# Patient Record
Sex: FEMALE | Race: WHITE
[De-identification: names, ages, dates, MRNs, and addresses within clinical notes are randomized per-mention and may not be internally consistent; named-entity substitution may affect disease eponyms.]

---

## 2019-03-25 ENCOUNTER — HOSPITAL ENCOUNTER (OUTPATIENT)
Dept: HOSPITAL 10 - OBT | Age: 24
Discharge: HOME | End: 2019-03-25
Attending: SPECIALIST
Payer: COMMERCIAL

## 2019-03-25 ENCOUNTER — HOSPITAL ENCOUNTER (OUTPATIENT)
Dept: HOSPITAL 91 - OBT | Age: 24
Discharge: HOME | End: 2019-03-25
Payer: COMMERCIAL

## 2019-03-25 VITALS
BODY MASS INDEX: 42.95 KG/M2 | WEIGHT: 204.59 LBS | HEIGHT: 58 IN | WEIGHT: 204.59 LBS | BODY MASS INDEX: 42.95 KG/M2 | HEIGHT: 58 IN

## 2019-03-25 VITALS — HEART RATE: 97 BPM | DIASTOLIC BLOOD PRESSURE: 65 MMHG | SYSTOLIC BLOOD PRESSURE: 99 MMHG | RESPIRATION RATE: 18 BRPM

## 2019-03-25 DIAGNOSIS — Z3A.37: ICD-10-CM

## 2019-03-25 DIAGNOSIS — O36.5930: Primary | ICD-10-CM

## 2019-03-25 PROCEDURE — 76818 FETAL BIOPHYS PROFILE W/NST: CPT

## 2019-03-25 PROCEDURE — 76815 OB US LIMITED FETUS(S): CPT

## 2019-03-25 PROCEDURE — G0463 HOSPITAL OUTPT CLINIC VISIT: HCPCS

## 2019-03-25 NOTE — TRIAGE
===================================

OB Triage

===================================

Datetime Report Generated by CPN: 03/25/2019 19:43

   

   

===========================

Datetime: 03/25/2019 19:24

===========================

   

 Stage of Pregnancy:  OB Triage

   

===========================

Datetime: 03/25/2019 18:52

===========================

   

 Stage of Pregnancy:  OB Triage

   

===========================

Datetime: 03/25/2019 18:35

===========================

   

   

===================================

Labor Evaluation

===================================

   

 Frequency:  0

 Monitor Mode:  External

 Pattern:  Normal: <= 5 Contractions in 10 Minutes

 Resting Tone [place]:  Relaxed

   

===================================

Fetal Heart Rate

===================================

   

 FHR Baseline Rate:  135

 Monitor Mode:  External US

 Variability:  Moderate 6-25 bpm

 Accelerations:  10X10

 Decelerations:  None

 Category:  Category I

   

===================================

Pain Assessment

===================================

   

 Pain Scale:  0

 Pain Presence:  None/Denies

 Pain Type:  N/A

 Pain Goal:  3

 Pain Relief Measures:  Comfort Measures

   

===========================

Datetime: 03/25/2019 17:42

===========================

   

 Stage of Pregnancy:  OB Triage

   

===========================

Datetime: 03/25/2019 16:14

===========================

   

 Stage of Pregnancy:  OB Triage

 Assessment Type:  Triage

   

===================================

Maternal Assessment

===================================

   

 Level of Consciousness:  Fully Conscious

 DTR's/Clonus:  DTRs 2+; No Clonus

 Headache:  Denies

 Blurred Vision:  No

 Respiratory Effort:  Unlabored; Regular Rhythm; Equal Expansion

 Breath Sounds, Left:  Clear and Equal

 Breath Sounds, Right:  Clear and Equal

 Nausea/Vomiting:  Denies

 RUQ Epigastric Pain:  Denies

 Facial Edema:  None

 Temperature Route:  Axillary

   

===================================

Fall Risk Assessment

===================================

   

 History of Falling:  (0) No

 Secondary Diagnosis:  (0) No

 Ambulatory Aid:  (0) Bedrest/Nurse Assist

 IV Therapy:  (0) No

 Gait:  (0) Normal/Bedrest/Immobile

 Mental Status:  (0) Oriented to Own Ability

 Fall Score:  0

 Fall Risk Score Definition:  No Risk: No action required

   

===================================

Labor Evaluation

===================================

   

 Frequency:  0

 Monitor Mode:  External

 Pattern:  Normal: <= 5 Contractions in 10 Minutes

 Resting Tone [place]:  Relaxed

   

===================================

Fetal Heart Rate

===================================

   

 FHR Baseline Rate:  150

 Monitor Mode:  External US

 Variability:  Moderate 6-25 bpm

 Decelerations:  None

   

===================================

Pain Assessment

===================================

   

 Pain Scale:  0

 Pain Presence:  None/Denies

 Pain Type:  N/A

 Pain Goal:  3

 Pain Relief Measures:  Comfort Measures

   

===========================

Datetime: 03/25/2019 16:12

===========================

   

 Time of Arrival:  03/25/2019 15:02

 EGA:  37.4

 Arrived By:  Ambulatory

 Arrived From:  Dr. Office

 Chief Complaint:  REFERRED FROM CLINIC FOR SIZE/DATES, DENIES LEAKING, BLEEDING, OR UC'S

 Fetal Movement:  Present

 Contractions:  Denies/Absent

 Rupture of Membranes:  Denies

 Vaginal Bleeding:  None

 Vaginal Discharge:  Denies

 Recent Sexual Intercouse:  Denies

 Abdominal Trauma:  Not Applicable

 Time Provider Notified:  03/25/2019 17:42

 Provider Notified:  nadya

 Initial Plan:  MONITOR, U/S

## 2019-03-25 NOTE — HP
Date/Time of Note


Date/Time of Note


DATE: 3/25/19 


TIME: 19:58





OB - History


Hx of Present Pregnancy


Free Text/Dictation


22 YO G1 with IUP at 37 weeks with EDC 04/11/2019. she was seen by Dr. Medina 6 


days ago. records NA at this time. she reports good FM. she denies UC, LOF per 


vagina, or vaginal bleeding. she is sent here for antepartum testing for 


possible IUGR. NST is category one.


Prenatal Care:  [facility]


Ultrasounds:  Normal mid trimester US


Obstetrical Complications:  None, Fetal Growth Restriction, Other (possible 


IUGR)


Medical Complications:  None





Past Family/Social History


*


Past Medical, Surgical, Family and Obstetric Histories reviewed from prenatal 


chart.





OB  Admission Exam


Vital Signs


Vital Signs





Vital Signs


  Date      Temp  Pulse  Resp  B/P (MAP)   Pulse Ox  O2          O2 Flow    FiO2


Time                                                 Delivery    Rate


   3/25/19  98.2     97    18  99/65 (76)


     16:21








Physical Exam


HEENT:  WNL


Heart:  Rhythm Normal


Lungs:  Clear, Equal


Abdomen:  WNL


Extremities:  Normal


Reflexes:  Normal





OB  Assessment/Plan


Other Assessment:


IUP at 37+ weeks


here for antepartum testing for possible IUGR


Plan:  Other (may be discharged home. NST in 3 day. )











HUNTER VALLEJO MD            Mar 25, 2019 20:03

## 2019-03-31 ENCOUNTER — HOSPITAL ENCOUNTER (OUTPATIENT)
Dept: HOSPITAL 91 - OBT | Age: 24
Discharge: HOME | End: 2019-03-31
Payer: COMMERCIAL

## 2019-03-31 ENCOUNTER — HOSPITAL ENCOUNTER (OUTPATIENT)
Dept: HOSPITAL 10 - OBT | Age: 24
Discharge: HOME | End: 2019-03-31
Attending: SPECIALIST
Payer: COMMERCIAL

## 2019-03-31 VITALS
HEIGHT: 62 IN | HEIGHT: 62 IN | BODY MASS INDEX: 26.29 KG/M2 | WEIGHT: 142.86 LBS | BODY MASS INDEX: 26.29 KG/M2 | WEIGHT: 142.86 LBS

## 2019-03-31 DIAGNOSIS — O26.93: Primary | ICD-10-CM

## 2019-03-31 DIAGNOSIS — O26.853: ICD-10-CM

## 2019-03-31 DIAGNOSIS — Z3A.38: ICD-10-CM

## 2019-03-31 PROCEDURE — G0463 HOSPITAL OUTPT CLINIC VISIT: HCPCS

## 2019-03-31 PROCEDURE — 76818 FETAL BIOPHYS PROFILE W/NST: CPT

## 2019-03-31 NOTE — PN
Triage Information


Date/Time





Reason for visit:  Vag spotting / bleeding


Weeks of Gestation


38w3d


/Para





Diabetes:  none


Hypertention:  none


Additional information


c/o vaginal pain





Objective


/69, P100 R 18 T 98.4


Fetal Heart Rate:  140's


Fetal Heart Rate Comments


CAT I


Contractions:  >10 Minutes Apart


Exam


VE 0/0/-3


recked  in 3hrs?  no change





Results/Medications


Imaging Results


BPP 


JOCELINE   15


Disposition:  Discharge


Assessment/Plan


A  IUP 38w3d


    NIL


    vaginal pain with spotting


P  discharge home  with routine labor instructions


    RTH prSHEKHAR Taylor MD                Mar 31, 2019 23:15

## 2019-03-31 NOTE — TRIAGE
===================================

OB Triage

===================================

Datetime Report Generated by CPN: 03/31/2019 22:14

   

   

===========================

Datetime: 03/31/2019 21:16

===========================

   

 Stage of Pregnancy:  OB Triage

   

===================================

Labor Evaluation

===================================

   

 Frequency:  1-6

 Monitor Mode:  External

 Duration (sec)2399:  

 Quality:  Mild

 Pattern:  Normal: <= 5 Contractions in 10 Minutes

 Resting Tone [place]:  Relaxed

   

===================================

Fetal Heart Rate

===================================

   

 FHR Baseline Rate:  130

 Monitor Mode:  External US

 FHR Baseline Changes:  No Baseline Change

 Variability:  Moderate 6-25 bpm

 Accelerations:  15X15

 Decelerations:  None

 Category:  Category I

   

===========================

Datetime: 03/31/2019 21:15

===========================

   

   

===================================

Vaginal Exam

===================================

   

 Dilatation (cms):  0.0

 Effacement (%):  50

 Station:  -2

 Exam By:  BOB Drake

 Vaginal Bleeding:  None

 Cervix, Consistency:  Firm

 Cervix, Position:  Posterior

   

===========================

Datetime: 03/31/2019 21:00

===========================

   

 Stage of Pregnancy:  OB Triage

   

===================================

Labor Evaluation

===================================

   

 Frequency:  2-6

 Monitor Mode:  External

 Duration (sec)2399:  

 Quality:  Mild

 Pattern:  Normal: <= 5 Contractions in 10 Minutes

 Resting Tone [place]:  Relaxed

   

===================================

Fetal Heart Rate

===================================

   

 FHR Baseline Rate:  130

 Monitor Mode:  External US

 Variability:  Moderate 6-25 bpm

 Accelerations:  15X15

 Decelerations:  None

 Category:  Category I

   

===========================

Datetime: 03/31/2019 20:00

===========================

   

 Stage of Pregnancy:  OB Triage

   

===================================

Labor Evaluation

===================================

   

 Frequency:  1-5

 Monitor Mode:  External

 Duration (sec)2399:  

 Quality:  Mild

 Pattern:  Normal: <= 5 Contractions in 10 Minutes

 Resting Tone [place]:  Relaxed

   

===================================

Fetal Heart Rate

===================================

   

 FHR Baseline Rate:  135

 Monitor Mode:  External US

 Variability:  Moderate 6-25 bpm

 Accelerations:  15X15

 Decelerations:  None

 Category:  Category I

   

===========================

Datetime: 03/31/2019 19:35

===========================

   

 Stage of Pregnancy:  OB Triage

   

===========================

Datetime: 03/31/2019 19:19

===========================

   

 Stage of Pregnancy:  OB Triage

   

===========================

Datetime: 03/31/2019 19:17

===========================

   

 Stage of Pregnancy:  OB Triage

 Assessment Type:  Triage

   

===================================

Maternal Assessment

===================================

   

 Level of Consciousness:  Fully Conscious

 DTR's/Clonus:  DTRs 2+; No Clonus

 Headache:  Denies

 Blurred Vision:  No

 Respiratory Effort:  Unlabored; Regular Rhythm; Equal Expansion

 Breath Sounds, Left:  Clear and Equal

 Breath Sounds, Right:  Clear and Equal

 Nausea/Vomiting:  Denies

 RUQ Epigastric Pain:  Denies

 Lower Extremities Edema:  None

     Degree:  None

 Upper Extremities Edema:  None

     Degree:  None

 Facial Edema:  None

 Temperature Route:  Oral

   

===================================

Fall Risk Assessment

===================================

   

 History of Falling:  (0) No

 Secondary Diagnosis:  (0) No

 Ambulatory Aid:  (0) Bedrest/Nurse Assist

 IV Therapy:  (0) No

 Gait:  (0) Normal/Bedrest/Immobile

 Mental Status:  (0) Oriented to Own Ability

 Fall Score:  0

 Fall Risk Score Definition:  No Risk: No action required

   

===================================

Pain Assessment

===================================

   

 Pain Scale:  4

 Pain Presence:  Intermittent

 Pain Type:  Cramping; Ache

 Pain Location:  Back

 Pain Relief Measures:  Comfort Measures

   

===========================

Datetime: 03/31/2019 19:02

===========================

   

   

===================================

Maternal Assessment

===================================

   

 Level of Consciousness:  Fully Conscious

 DTR's/Clonus:  DTRs 1+

 Headache:  Denies

 Blurred Vision:  No

 Nausea/Vomiting:  Denies

 RUQ Epigastric Pain:  Denies

 Facial Edema:  None

   

===================================

Labor Evaluation

===================================

   

 Frequency:  X1

 Monitor Mode:  External

 Duration (sec)2399:  50

 Quality:  Mild

 Pattern:  Normal: <= 5 Contractions in 10 Minutes

 Resting Tone [place]:  Relaxed

   

===================================

Fetal Heart Rate

===================================

   

 FHR Baseline Rate:  135

 Monitor Mode:  External US

 Variability:  Moderate 6-25 bpm

 Accelerations:  15X15

 Decelerations:  None

 Category:  Category I

   

===================================

Pain Assessment

===================================

   

 Pain Scale:  0

 Pain Presence:  None/Denies

 Pain Type:  N/A

 Pain Goal:  3

 Membrane Status:  Intact

   

===========================

Datetime: 03/31/2019 18:50

===========================

   

 Assessment Type:  Triage

   

===================================

Maternal Assessment

===================================

   

 Level of Consciousness:  Fully Conscious

 DTR's/Clonus:  DTRs 2+; No Clonus

 Headache:  Denies

 Blurred Vision:  No

 Respiratory Effort:  Unlabored; Regular Rhythm; Equal Expansion

 Breath Sounds, Left:  Clear and Equal

 Breath Sounds, Right:  Clear and Equal

 Nausea/Vomiting:  Denies

 RUQ Epigastric Pain:  Denies

 Lower Extremities Edema:  None

     Degree:  None

 Upper Extremities Edema:  None

     Degree:  None

 Facial Edema:  None

   

===================================

Fall Risk Assessment

===================================

   

 History of Falling:  (0) No

 Secondary Diagnosis:  (0) No

 Ambulatory Aid:  (0) Bedrest/Nurse Assist

 IV Therapy:  (0) No

 Gait:  (0) Normal/Bedrest/Immobile

 Mental Status:  (0) Oriented to Own Ability

 Fall Score:  0

 Fall Risk Score Definition:  No Risk: No action required

   

===========================

Datetime: 03/31/2019 18:32

===========================

   

 Time of Arrival:  03/31/2019 18:32

 EGA:  38.3

 Arrived By:  Wheelchair

 Arrived From:  Home

 Chief Complaint:  PT CAME IN C/O SPOTTING THIS AM, VAGINAL PAIN AND DFM

 Fetal Movement:  Decreased

 Contractions:  Denies/Absent

 Rupture of Membranes:  Denies

 Vaginal Discharge:  Denies

 Recent Sexual Intercouse:  Denies

 Abdominal Trauma:  Not Applicable

 Additional Patient Complaints:  NONE

 Initial Plan:  NST AND BPP

   

===========================

Datetime: 03/25/2019 16:14

===========================

   

 Fall Score:  0

 Fall Risk Score Definition:  No Risk: No action required

   

===========================

Datetime: 03/25/2019 16:12

===========================

   

 EGA:  37.4

## 2019-04-09 ENCOUNTER — HOSPITAL ENCOUNTER (OUTPATIENT)
Dept: HOSPITAL 91 - OBT | Age: 24
Discharge: HOME | End: 2019-04-09
Payer: COMMERCIAL

## 2019-04-09 ENCOUNTER — HOSPITAL ENCOUNTER (OUTPATIENT)
Dept: HOSPITAL 10 - L-D | Age: 24
Discharge: HOME | End: 2019-04-09
Attending: SPECIALIST
Payer: COMMERCIAL

## 2019-04-09 VITALS
BODY MASS INDEX: 26.49 KG/M2 | BODY MASS INDEX: 26.49 KG/M2 | WEIGHT: 143.96 LBS | HEIGHT: 62 IN | WEIGHT: 143.96 LBS | HEIGHT: 62 IN

## 2019-04-09 VITALS — DIASTOLIC BLOOD PRESSURE: 68 MMHG | RESPIRATION RATE: 17 BRPM | SYSTOLIC BLOOD PRESSURE: 102 MMHG | HEART RATE: 81 BPM

## 2019-04-09 DIAGNOSIS — Z3A.38: ICD-10-CM

## 2019-04-09 DIAGNOSIS — O26.843: ICD-10-CM

## 2019-04-09 DIAGNOSIS — O60.03: Primary | ICD-10-CM

## 2019-04-09 PROCEDURE — 76818 FETAL BIOPHYS PROFILE W/NST: CPT

## 2019-04-09 PROCEDURE — G0463 HOSPITAL OUTPT CLINIC VISIT: HCPCS

## 2019-04-09 NOTE — PN
Triage Information


Date/Time


2019


Reason for visit:  Uterine contractions


Weeks of Gestation


38w 2d


/Para


1/0


Diabetes:  none


Hypertention:  none


Additional information


PMHx: none.


PSHx: none.


NKDA





Objective





Vital Signs


  Date      Temp  Pulse  Resp  B/P (MAP)   Pulse Ox  O2          O2 Flow    FiO2


Time                                                 Delivery    Rate


    19  98.2     81    17      102/68            Room Air


     04:32                           (79)





Fetal Heart Rate:  120's


Contractions:  6-10 Minutes Apart (sometimes closer at 3 to 5 minutes apart)


Exam


40%/FT/-4





Results/Medications


Imaging Results


Pending BPP.


Disposition:  Discharge


Assessment/Plan


A: IUP at 38w 2d.


Dates discrepancy as pt had an early US at 10 weeks which gives her a due date 


of  and the later US's all date her with a due date of .


Early labor.


P: BPP pending. If all fine then pt to be d/c'ed home and is to return when the 


contractions are a lot stronger and last longer or if she breaks her water. 


Offered the pt the ability to stay or to go and initially she wanted to stay and


then she decided to go. She [place] close by. We will still work to get the hard c


opy of her initial US. Reviewed labor precaution with the pt.











MICK SIMON MD              2019 06:24

## 2019-04-10 ENCOUNTER — HOSPITAL ENCOUNTER (OUTPATIENT)
Dept: HOSPITAL 10 - OBT | Age: 24
Discharge: HOME | End: 2019-04-10
Attending: SPECIALIST
Payer: COMMERCIAL

## 2019-04-10 ENCOUNTER — HOSPITAL ENCOUNTER (OUTPATIENT)
Dept: HOSPITAL 91 - OBT | Age: 24
Discharge: HOME | End: 2019-04-10
Payer: COMMERCIAL

## 2019-04-10 DIAGNOSIS — Z3A.38: ICD-10-CM

## 2019-04-10 PROCEDURE — G0463 HOSPITAL OUTPT CLINIC VISIT: HCPCS

## 2019-04-10 NOTE — PN
Triage Information


Date/Time





Reason for visit:  Uterine contractions


Weeks of Gestation


23-year-old  1 para 0 at 38 weeks and 3 days of gestation with estimated 


date of delivery 2019


Patient presents with chief complaint of uterine contractions, she reports 


positive fetal movement, denies any vaginal bleeding or leaking fluid


/Para


 1 para 0


Diabetes:  none


Hypertention:  none





Objective


Fetal Heart Rate:  140's


Fetal Heart Rate Comments


Fetal heart rate tracing category 1


Contractions:  6-10 Minutes Apart


Exam


Cervix fingertip/30%/-3 per nurse


Disposition:  Discharge


Assessment/Plan


Patient is not in labor


Fetal kick count instructions were given


Labor precautions were given


Patient instructed to follow-up with her own OB/GYN in 1-2 days











SARAI SCHULTZ MD             Apr 10, 2019 23:49

## 2019-04-10 NOTE — TRIAGE
===================================

OB Triage

===================================

Datetime Report Generated by CPN: 04/10/2019 23:51

   

   

===========================

Datetime: 04/10/2019 23:41

===========================

   

 Stage of Pregnancy:  OB Triage

   

===========================

Datetime: 04/10/2019 23:21

===========================

   

   

===================================

Labor Evaluation

===================================

   

 Frequency:  4-7

 Monitor Mode:  External

 Duration (sec)2399:  70-90

 Pattern:  Normal: <= 5 Contractions in 10 Minutes

   

===================================

Fetal Heart Rate

===================================

   

 FHR Baseline Rate:  120

 FHR Baseline Changes:  No Baseline Change

 Variability:  Moderate 6-25 bpm

   

===========================

Datetime: 04/10/2019 22:47

===========================

   

 Stage of Pregnancy:  OB Triage

 Assessment Type:  Triage

   

===================================

Vaginal Exam

===================================

   

 Dilatation (cms):  0.5

 Effacement (%):  30

 Station:  -3

 Exam By:  GISEAL 

 Vaginal Bleeding:  Scant

 Cervix, Consistency:  Firm

 Cervix, Position:  Midposition

 Fetal Presentation 'A':  Cephalic

   

===========================

Datetime: 04/10/2019 22:28

===========================

   

 Time of Arrival:  04/10/2019 22:28

 EGA:  38.3

 Arrived By:  Wheelchair

 Arrived From:  Home

 Chief Complaint:  UC'S SINCE 2000

      

 Fetal Movement:  Present

 Contractions:  Regular

 Time Contractions Began:  04/10/2019 20:00

 Contractions:  Q6MIN

 Rupture of Membranes:  Denies

 Vaginal Bleeding:  Scant

 Vaginal Discharge:  Denies

 Recent Sexual Intercouse:  Denies

 Abdominal Trauma:  Not Applicable

 Patient Complaints:  Contractions

 Additional Patient Complaints:  PATIENT SEEN ON 04/09/19 FOR SAME COMPLAINT

   

===========================

Datetime: 04/09/2019 07:14

===========================

   

 Stage of Pregnancy:  OB Triage

   

===================================

Labor Evaluation

===================================

   

 Frequency:  1-5

 Monitor Mode:  External

 Pattern:  Normal: <= 5 Contractions in 10 Minutes

 Resting Tone [place]:  Relaxed

   

===================================

Fetal Heart Rate

===================================

   

 FHR Baseline Rate:  125

 Monitor Mode:  External US

 Variability:  Moderate 6-25 bpm

 Accelerations:  15X15

 Decelerations:  None

 Category:  Category I

   

===================================

Pain Assessment

===================================

   

 Pain Scale:  1

 Pain Presence:  Intermittent

 Pain Type:  Cramping

 Pain Location:  Abdomen

 Pain Goal:  0

 Pain Relief Measures:  Comfort Measures

   

===========================

Datetime: 04/09/2019 07:00

===========================

   

   

===================================

Labor Evaluation

===================================

   

 Frequency:  2-5

      

 Monitor Mode:  External

 Duration (sec)2399:  

 Pattern:  Normal: <= 5 Contractions in 10 Minutes

   

===================================

Fetal Heart Rate

===================================

   

 FHR Baseline Rate:  130

 Monitor Mode:  External US

 Variability:  Moderate 6-25 bpm (Annotations: Period of minimal variability)

 Accelerations:  15X15

 Decelerations:  None

 Category:  Category I

   

===========================

Datetime: 04/09/2019 06:26

===========================

   

 Resting Tone [place]:  Relaxed

   

===========================

Datetime: 04/09/2019 06:00

===========================

   

   

===================================

Labor Evaluation

===================================

   

 Frequency:  2-6

 Monitor Mode:  External

 Duration (sec)2399:  

 Pattern:  Normal: <= 5 Contractions in 10 Minutes

   

===================================

Fetal Heart Rate

===================================

   

 FHR Baseline Rate:  125

 Monitor Mode:  External US

 Variability:  Moderate 6-25 bpm (Annotations: Periods of minimal variability)

 Accelerations:  15X15

 Decelerations:  None

 Category:  Category I

   

===========================

Datetime: 04/09/2019 05:51

===========================

   

 Quality:  Mild

 Resting Tone [place]:  Relaxed

   

===================================

Pain Assessment

===================================

   

 Pain Scale:  2

 Pain Presence:  Intermittent

 Pain Type:  Contraction

 Pain Location:  Abdomen; Back

 Pain Relief Measures:  Comfort Measures

   

===========================

Datetime: 04/09/2019 05:46

===========================

   

   

===================================

Vaginal Exam

===================================

   

 Dilatation (cms):  0.5

 Effacement (%):  40

 Station:  -4

 Exam By:  Dr. Reiche

 Vaginal Bleeding:  Scant

   

===========================

Datetime: 04/09/2019 05:45

===========================

   

 Quality:  Mild

   

===========================

Datetime: 04/09/2019 05:25

===========================

   

   

===================================

Pain Assessment

===================================

   

 Pain Scale:  10

 Pain Presence:  Intermittent

 Pain Type:  Contraction

 Pain Location:  Abdomen; Back

 Pain Relief Measures:  Comfort Measures

   

===========================

Datetime: 04/09/2019 05:00

===========================

   

   

===================================

Labor Evaluation

===================================

   

 Frequency:  5-10

 Monitor Mode:  External

 Duration (sec)2399:  

 Pattern:  Normal: <= 5 Contractions in 10 Minutes

   

===================================

Fetal Heart Rate

===================================

   

 FHR Baseline Rate:  125

 Monitor Mode:  External US

 Variability:  Minimal - Undetectable to <=5 bpm

 Accelerations:  15X15

 Decelerations:  None

 Category:  Category II

   

===========================

Datetime: 04/09/2019 04:41

===========================

   

   

===================================

Vaginal Exam

===================================

   

 Dilatation (cms):  0.5

 Effacement (%):  40

 Station:  -4

 Exam By:  Lizzy VANESSA RN

 Cervix, Consistency:  Moderate

 Cervix, Position:  Midposition

   

===========================

Datetime: 04/09/2019 04:32

===========================

   

 Stage of Pregnancy:  OB Triage

 Assessment Type:  Triage

   

===================================

Maternal Assessment

===================================

   

 Level of Consciousness:  Fully Conscious

 DTR's/Clonus:  DTRs 2+; No Clonus

 Headache:  Denies

 Blurred Vision:  No

 Respiratory Effort:  Unlabored; Regular Rhythm; Equal Expansion

 Breath Sounds, Left:  Clear and Equal

 Breath Sounds, Right:  Clear and Equal

 Nausea/Vomiting:  Denies

 RUQ Epigastric Pain:  Denies

 Lower Extremities Edema:  None

     Degree:  None

 Upper Extremities Edema:  None

     Degree:  None

 Facial Edema:  None

 Temperature Route:  Oral

   

===================================

Fall Risk Assessment

===================================

   

 History of Falling:  (0) No

 Secondary Diagnosis:  (0) No

 Ambulatory Aid:  (0) Bedrest/Nurse Assist

 IV Therapy:  (0) No

 Gait:  (0) Normal/Bedrest/Immobile

 Mental Status:  (0) Oriented to Own Ability

 Fall Score:  0

 Fall Risk Score Definition:  No Risk: No action required

 Comments:  Patient states she feels active fetal movement. 

   

===================================

Pain Assessment

===================================

   

 Pain Scale:  8

 Pain Presence:  Intermittent

 Pain Type:  Contraction

 Pain Location:  Abdomen; Back

 Pain Relief Measures:  Comfort Measures

   

===========================

Datetime: 04/09/2019 04:29

===========================

   

 Monitor Mode:  External (Annotations: applied)

 Quality:  Mild

 Resting Tone [place]:  Relaxed

 Monitor Mode:  External US (Annotations: applied)

   

===========================

Datetime: 04/09/2019 04:10

===========================

   

 Time of Arrival:  04/09/2019 04:10

 EGA:  38.2

 Arrived By:  Wheelchair

 Arrived From:  Home

 Chief Complaint:  contractions since 04/09/19 at 0300

 Fetal Movement:  Present

 Contractions:  Irregular

 Contractions:  Q3-5min per patient

 Rupture of Membranes:  Denies

 Vaginal Bleeding:  None

 Vaginal Discharge:  Denies

 Recent Sexual Intercouse:  Denies

 Abdominal Trauma:  Not Applicable

 Patient Complaints:  Contractions

 Additional Patient Complaints:  EDC discrepancy between 04/11/19 and 04/21/19

 Time Provider Notified:  04/09/2019 05:03

 Provider Notified:  Dr. Bai

 Initial Plan:  EFM x2, SVE

   

===========================

Datetime: 03/31/2019 21:08

===========================

   

 Stage of Pregnancy:  OB Triage

   

===========================

Datetime: 03/31/2019 19:17

===========================

   

 Fall Score:  0

 Fall Risk Score Definition:  No Risk: No action required

   

===========================

Datetime: 03/31/2019 18:50

===========================

   

 Fall Score:  0

 Fall Risk Score Definition:  No Risk: No action required

   

===========================

Datetime: 03/31/2019 18:32

===========================

   

 EGA:  37.0

 Time Provider Notified:  03/31/2019 18:32

 Provider Notified:  

   

===========================

Datetime: 03/25/2019 16:14

===========================

   

 Fall Score:  0

 Fall Risk Score Definition:  No Risk: No action required

   

===========================

Datetime: 03/25/2019 16:12

===========================

   

 EGA:  36.1

## 2019-04-11 ENCOUNTER — HOSPITAL ENCOUNTER (INPATIENT)
Dept: HOSPITAL 10 - L-D | Age: 24
LOS: 3 days | Discharge: HOME | End: 2019-04-14
Attending: SPECIALIST | Admitting: SPECIALIST
Payer: COMMERCIAL

## 2019-04-11 ENCOUNTER — HOSPITAL ENCOUNTER (INPATIENT)
Dept: HOSPITAL 91 - L-D | Age: 24
LOS: 3 days | Discharge: HOME | End: 2019-04-14
Payer: COMMERCIAL

## 2019-04-11 VITALS — RESPIRATION RATE: 18 BRPM | HEART RATE: 88 BPM | DIASTOLIC BLOOD PRESSURE: 65 MMHG | SYSTOLIC BLOOD PRESSURE: 98 MMHG

## 2019-04-11 VITALS
HEIGHT: 62 IN | WEIGHT: 142.2 LBS | WEIGHT: 142.2 LBS | BODY MASS INDEX: 26.17 KG/M2 | BODY MASS INDEX: 26.17 KG/M2 | HEIGHT: 62 IN

## 2019-04-11 DIAGNOSIS — O48.0: Primary | ICD-10-CM

## 2019-04-11 DIAGNOSIS — Z3A.40: ICD-10-CM

## 2019-04-11 LAB
ADD MAN DIFF?: NO
BASOPHIL #: 0 10^3/UL (ref 0–0.1)
BASOPHILS %: 0.1 % (ref 0–2)
EOSINOPHILS #: 0 10^3/UL (ref 0–0.5)
EOSINOPHILS %: 0.1 % (ref 0–7)
HEMATOCRIT: 38.9 % (ref 37–47)
HEMOGLOBIN: 13.3 G/DL (ref 12–16)
HEPATITIS B SURFACE ANTIGEN: NEGATIVE
IMMATURE GRANS #M: 0.05 10^3/UL (ref 0–0.03)
IMMATURE GRANS % (M): 0.5 % (ref 0–0.43)
INR: 0.9
LYMPHOCYTES #: 1.5 10^3/UL (ref 0.8–2.9)
LYMPHOCYTES %: 14.3 % (ref 15–51)
MEAN CORPUSCULAR HEMOGLOBIN: 30.3 PG (ref 29–33)
MEAN CORPUSCULAR HGB CONC: 34.2 G/DL (ref 32–37)
MEAN CORPUSCULAR VOLUME: 88.6 FL (ref 82–101)
MEAN PLATELET VOLUME: 9.2 FL (ref 7.4–10.4)
MONOCYTE #: 0.7 10^3/UL (ref 0.3–0.9)
MONOCYTES %: 6.5 % (ref 0–11)
NEUTROPHIL #: 8 10^3/UL (ref 1.6–7.5)
NEUTROPHILS %: 78.5 % (ref 39–77)
NUCLEATED RED BLOOD CELLS #: 0 10^3/UL (ref 0–0)
NUCLEATED RED BLOOD CELLS%: 0 /100WBC (ref 0–0)
PARTIAL THROMBOPLASTIN TIME: 28.6 SEC (ref 23–35)
PLATELET COUNT: 247 10^3/UL (ref 140–415)
PROTIME: 12.3 SEC (ref 11.9–14.9)
PT RATIO: 1
RED BLOOD COUNT: 4.39 10^6/UL (ref 4.2–5.4)
RED CELL DISTRIBUTION WIDTH: 13.5 % (ref 11.5–14.5)
WHITE BLOOD COUNT: 10.2 10^3/UL (ref 4.8–10.8)

## 2019-04-11 PROCEDURE — 86592 SYPHILIS TEST NON-TREP QUAL: CPT

## 2019-04-11 PROCEDURE — 85730 THROMBOPLASTIN TIME PARTIAL: CPT

## 2019-04-11 PROCEDURE — 76815 OB US LIMITED FETUS(S): CPT

## 2019-04-11 PROCEDURE — 86900 BLOOD TYPING SEROLOGIC ABO: CPT

## 2019-04-11 PROCEDURE — 3E033VJ INTRODUCTION OF OTHER HORMONE INTO PERIPHERAL VEIN, PERCUTANEOUS APPROACH: ICD-10-PCS

## 2019-04-11 PROCEDURE — 3E033VJ INTRODUCTION OF OTHER HORMONE INTO PERIPHERAL VEIN, PERCUTANEOUS APPROACH: ICD-10-PCS | Performed by: OBSTETRICS & GYNECOLOGY

## 2019-04-11 PROCEDURE — 85610 PROTHROMBIN TIME: CPT

## 2019-04-11 PROCEDURE — 86850 RBC ANTIBODY SCREEN: CPT

## 2019-04-11 PROCEDURE — 85025 COMPLETE CBC W/AUTO DIFF WBC: CPT

## 2019-04-11 PROCEDURE — 86901 BLOOD TYPING SEROLOGIC RH(D): CPT

## 2019-04-11 PROCEDURE — 87340 HEPATITIS B SURFACE AG IA: CPT

## 2019-04-11 RX ADMIN — PYRIDOXINE HYDROCHLORIDE 1 MLS/HR: 100 INJECTION, SOLUTION INTRAMUSCULAR; INTRAVENOUS at 20:00

## 2019-04-11 RX ADMIN — Medication 1 MLS/HR: at 21:40

## 2019-04-11 RX ADMIN — PYRIDOXINE HYDROCHLORIDE SCH MLS/HR: 100 INJECTION, SOLUTION INTRAMUSCULAR; INTRAVENOUS at 20:00

## 2019-04-11 RX ADMIN — PYRIDOXINE HYDROCHLORIDE 1 MLS/HR: 100 INJECTION, SOLUTION INTRAMUSCULAR; INTRAVENOUS at 17:51

## 2019-04-11 RX ADMIN — PYRIDOXINE HYDROCHLORIDE SCH MLS/HR: 100 INJECTION, SOLUTION INTRAMUSCULAR; INTRAVENOUS at 17:51

## 2019-04-11 NOTE — PREAC
Date/Time of Note


Date/Time of Note


DATE: 4/11/19 


TIME: 19:54





Anesthesia Eval and Record


Evaluation


Time Pre-Procedure Interview


DATE: 4/11/19 


TIME: 19:54


Age


23


Sex


female


NPO:  8 hrs


Preoperative diagnosis


Pregnancy in labor


Planned procedure


Labor epidural





Past Medical History


Past Medical History:  None





Surgery & Anesthesia Issues


No known issue





Meds


Anticoagulation:  No


Beta Blocker within 24 hr:  No


Reason Beta Blocker not given:  Pt. not on B-Blocker


Reported Medications


Ferrous Sulfate (Iron) 134 Mg Tablet, 134 MG PO, TAB


   3/31/19


PNV115-Iron Fumarate-FA-DSS (Prenatal 19) 1 Each Tablet, 1 TAB PO DAILY, TAB


   3/25/19





Current Medications


Lactated Ringer's 1,000 ml @  125 mls/hr Q8H IV  Last administered on 4/11/19at 


17:51; Admin Dose 125 MLS/HR;  Start 4/11/19 at 16:32


Butorphanol Tartrate (Stadol) 1 mg Q2H  PRN IV .PAIN;  Start 4/11/19 at 17:00


Butorphanol Tartrate (Stadol) 2 mg Q2H  PRN IV .PAIN;  Start 4/11/19 at 17:00


Lidocaine (Xylocaine 1% (Mpf)) 30 ml ONCE PRN INJ .EPISIOTOMY;  Start 4/11/19 at


17:00


Oxytocin/Lactated Ringer's 500 ml @  500 mls/hr ONCE POST PARTUM IV ;  Start 


4/11/19 at 17:00


Oxytocin/Lactated Ringer's 500 ml @  125 mls/hr POST PARTUM IV ;  Start 4/11/19 


at 17:00


Oxytocin/Lactated Ringer's 500 ml @ 0 mls/hr ONCE PRN IV .VAGINAL BLEEDING;  


Start 4/11/19 at 17:00


Methylergonovine Maleate (Methergine) 0.2 mg ONCE PRN IM .VAGINAL BLEEDING;  


Start 4/11/19 at 17:00


Carboprost Tromethamine (Hemabate) 250 mcg ONCE PRN IM .VAGINAL BLEEDING;  Start


4/11/19 at 17:00


Misoprostol (Cytotec) 1,000 mcg ONCE PRN OR .VAGINAL BLEEDING;  Start 4/11/19 at


17:00


Oxytocin/Lactated Ringer's 500 ml @ 0 mls/hr FOR AUGMENTATION IV ;  Start 


4/11/19 at 17:00


Meds reviewed:  Yes





Allergies


Coded Allergies:  


     No Known Allergy (Unverified , 4/9/19)


Allergies Reviewed:  Yes





Labs/Studies


Labs Reviewed:  Reviewed by anesthesiologist


Result Diagram:  


4/11/19 1657





Laboratory Tests


4/11/19 16:57











Blood Bank


                  Test
                         4/11/19
16:57


                  Antibody Screen               NEGATIVE


                  Blood Type                    O POSITIVE


                  Rh Immune Globulin Candidate  NO





Pregnancy test:  Positive





Pre-procedure Exam


Last vitals





Vital Signs


  Date      Temp  Pulse  Resp  B/P (MAP)   Pulse Ox  O2          O2 Flow    FiO2


Time                                                 Delivery    Rate


   4/11/19  98.3     88    18  98/65 (76)


     18:38





Airway:  Adequate mouth opening


Mallampati:  Mallampati II


Teeth:  Normal


Lung:  Normal


Heart:  Normal





ASA Physical Status


ASA physical status:  2


Emergency:  None





Planned Anesthetic


Neuraxial:  Epidural





Pre-operative Attestations


Prior to commencing anesthesia and surgery, the patient was re-evaluated, there 


was verification of:


*The patient's identity


*The results of appropriate recent lab work and preoperative vital signs


*The above evaluation not changing prior to induction


*Anesthetic plan, risk benefits, alternative and complications discussed with 


patient/family; questions answered; patient/family understands, accepts and 


wishes to proceed.











ELLEN RHOADES MD               Apr 11, 2019 19:55

## 2019-04-12 VITALS — DIASTOLIC BLOOD PRESSURE: 60 MMHG | SYSTOLIC BLOOD PRESSURE: 115 MMHG | HEART RATE: 82 BPM | RESPIRATION RATE: 18 BRPM

## 2019-04-12 VITALS — DIASTOLIC BLOOD PRESSURE: 58 MMHG | RESPIRATION RATE: 18 BRPM | HEART RATE: 69 BPM | SYSTOLIC BLOOD PRESSURE: 105 MMHG

## 2019-04-12 LAB — RAPID PLASMA REAGIN: NONREACTIVE

## 2019-04-12 RX ADMIN — FENTANYL CIT 0.2 MG/100ML-ROPIV 0.2%-NACL 0.9% EPIDURAL INJ 1 ML: 2/0.2 SOLUTION at 06:33

## 2019-04-12 RX ADMIN — DOCUSATE SODIUM AND SENNOSIDES SCH TAB: 8.6; 5 TABLET, FILM COATED ORAL at 21:28

## 2019-04-12 RX ADMIN — AMPICILLIN 1 MLS/HR: 2 INJECTION, POWDER, FOR SOLUTION INTRAVENOUS at 10:50

## 2019-04-12 RX ADMIN — WITCH HAZEL 40 PAD: 500 SOLUTION RECTAL; TOPICAL at 18:30

## 2019-04-12 RX ADMIN — DOCUSATE SODIUM AND SENNOSIDES 1 TAB: 8.6; 5 TABLET, FILM COATED ORAL at 21:28

## 2019-04-12 RX ADMIN — Medication 1 MLS/HR: at 14:30

## 2019-04-12 RX ADMIN — ONDANSETRON HYDROCHLORIDE 1 MG: 2 INJECTION, SOLUTION INTRAMUSCULAR; INTRAVENOUS at 09:16

## 2019-04-12 RX ADMIN — PYRIDOXINE HYDROCHLORIDE SCH MLS/HR: 100 INJECTION, SOLUTION INTRAMUSCULAR; INTRAVENOUS at 10:16

## 2019-04-12 RX ADMIN — PYRIDOXINE HYDROCHLORIDE 1 MLS/HR: 100 INJECTION, SOLUTION INTRAMUSCULAR; INTRAVENOUS at 00:51

## 2019-04-12 RX ADMIN — PYRIDOXINE HYDROCHLORIDE 1 MLS/HR: 100 INJECTION, SOLUTION INTRAMUSCULAR; INTRAVENOUS at 05:19

## 2019-04-12 RX ADMIN — Medication 1 MLS/HR: at 18:31

## 2019-04-12 RX ADMIN — FENTANYL CIT 0.2 MG/100ML-ROPIV 0.2%-NACL 0.9% EPIDURAL INJ SCH ML: 2/0.2 SOLUTION at 12:18

## 2019-04-12 RX ADMIN — Medication 56 SPRAY: at 18:30

## 2019-04-12 RX ADMIN — PYRIDOXINE HYDROCHLORIDE SCH MLS/HR: 100 INJECTION, SOLUTION INTRAMUSCULAR; INTRAVENOUS at 00:51

## 2019-04-12 RX ADMIN — FENTANYL CIT 0.2 MG/100ML-ROPIV 0.2%-NACL 0.9% EPIDURAL INJ 1 ML: 2/0.2 SOLUTION at 12:18

## 2019-04-12 RX ADMIN — FENTANYL CIT 0.2 MG/100ML-ROPIV 0.2%-NACL 0.9% EPIDURAL INJ SCH ML: 2/0.2 SOLUTION at 06:33

## 2019-04-12 RX ADMIN — Medication 1 ML: at 14:35

## 2019-04-12 RX ADMIN — Medication SCH MLS/HR: at 18:31

## 2019-04-12 RX ADMIN — Medication SCH MLS/HR: at 22:30

## 2019-04-12 RX ADMIN — LIDOCAINE HYDROCHLORIDE 1 ML: 10 INJECTION, SOLUTION EPIDURAL; INFILTRATION; INTRACAUDAL; PERINEURAL at 14:35

## 2019-04-12 RX ADMIN — IBUPROFEN 1 MG: 600 TABLET ORAL at 18:00

## 2019-04-12 RX ADMIN — Medication 1 APPLIC: at 18:30

## 2019-04-12 RX ADMIN — IBUPROFEN 1 MG: 600 TABLET ORAL at 15:42

## 2019-04-12 RX ADMIN — PYRIDOXINE HYDROCHLORIDE 1 MLS/HR: 100 INJECTION, SOLUTION INTRAMUSCULAR; INTRAVENOUS at 10:16

## 2019-04-12 RX ADMIN — Medication 1 MLS/HR: at 14:34

## 2019-04-12 RX ADMIN — IBUPROFEN SCH MG: 600 TABLET ORAL at 18:00

## 2019-04-12 RX ADMIN — Medication 1 MLS/HR: at 22:30

## 2019-04-12 RX ADMIN — PYRIDOXINE HYDROCHLORIDE SCH MLS/HR: 100 INJECTION, SOLUTION INTRAMUSCULAR; INTRAVENOUS at 05:19

## 2019-04-12 NOTE — HP
Date/Time of Note


Date/Time of Note


DATE: 19 


TIME: 15:03





OB - History


Hx of Present Pregnancy


Free Text/Dictation


23 y.o  primigravida  presents  OB triage  with c/o leaking  fluid since 


191500,with uc's 2-4 min apart,


Initial VE 1-2/70/-2 


EFM   CAT I tracing, EFW [id number]


serial u/s was s<d


rubella  Ab   equivocal


1hr GTT   150  f/b 3hr GTT  nl  Hb A1C   4.9


GBS  status neg 


admitted for expectant management,poss  pitocin augmentation.


Chief Complaint:  leaking fluid


Estimated Due Date:  2019


:  1


Para:  0


Spontaneous :  0


Therapeutic :  0


Prenatal Care:  Good Care


Ultrasounds:  Normal mid trimester US


Obstetrical Complications:  None


Medical Complications:  None





Past Family/Social History


*


Past Medical, Surgical, Family and Obstetric Histories reviewed from prenatal 


chart.


Blood Type:  O+


Rubella:  not immune


RPR/VDRL:  Negative


GBS Status:  Negative


HBsAG:  Negative (equivocal)





OB  Admission Exam


Vital Signs


Vital Signs





Vital Signs


  Date      Temp  Pulse  Resp  B/P (MAP)   Pulse Ox  O2          O2 Flow    FiO2


Time                                                 Delivery    Rate


   19  98.3     88    18  98/65 (76)


     18:38








Physical Exam


HEENT:  WNL


Heart:  Rhythm Normal


Lungs:  Clear, Equal


Abdomen:  WNL


Extremities:  Normal


Reflexes:  Normal


Cervical Dilatation:  other (1-2)


Effacement:  Other (70)


Station:  -2


Membranes:  Ruptured


Amniotic Fluid:  Clear


Fetal Heart Rate:  120's


Accelerations:  Accelerations Present


Decelerations:  No Decelerations


Varibility:  Moderate


Contractions on Admission:  < 5 Minutes Apart


Intensity:  Mild


Last 72 hours Lab Results


                                    CBC & BMP


19 16:57











OB  Assessment/Plan


Reason for admission:  rupture of membranes


Other Assessment:


IUP  40w


Plan:  Expectant Management, Other (poss pitocin augmentation)











SHEKHAR BERRY MD                2019 15:15

## 2019-04-12 NOTE — LDN
Date/Time of Note


Date/Time of Note


DATE: 19 


TIME: 15:20





Delivery Summary


 of normal female infant


Weeks of Gestation


40w1d


Placenta Delivered:  Spontaneously, Intact & Complete


Meconium:  none


Episiotomy:  Yes


Indication for episiotomy


expected lacerartion EFW  [id number]


Perineal laceration:  0


Laceration repair:  


00ch gut


Anesthesia type:  Epidural


Estimated blood loss:  200


Sponge & Needle done & correct:  Yes


All needle counts correct:  Yes


Any foreign bodies felt in the:  No





Infant Delivery Information


Sex


Infant Sex:  female





Apgars


1 Minute:  8


5 Minute:  9





Suctioning


Nose & mouth suctioned at kushal:  Yes


Delee suction performed:  Yes





Umbilical Cord


Umbilical cord with:  3 Vessels


Cord presentations:  no nuchal cord


Cord Blood was obtained:  Yes





Mother & Baby Disposition


Disposition


Mom & Baby to Maternity; Good:  Yes


Mom transferred to:  Other


Baby to NICU:  No (postpartum)











SHEKHAR BERRY MD                2019 15:24

## 2019-04-13 VITALS — HEART RATE: 64 BPM | RESPIRATION RATE: 17 BRPM | DIASTOLIC BLOOD PRESSURE: 51 MMHG | SYSTOLIC BLOOD PRESSURE: 89 MMHG

## 2019-04-13 VITALS — DIASTOLIC BLOOD PRESSURE: 54 MMHG | RESPIRATION RATE: 18 BRPM | SYSTOLIC BLOOD PRESSURE: 96 MMHG | HEART RATE: 80 BPM

## 2019-04-13 VITALS — SYSTOLIC BLOOD PRESSURE: 95 MMHG | HEART RATE: 73 BPM | DIASTOLIC BLOOD PRESSURE: 60 MMHG | RESPIRATION RATE: 18 BRPM

## 2019-04-13 VITALS — RESPIRATION RATE: 16 BRPM | DIASTOLIC BLOOD PRESSURE: 64 MMHG | HEART RATE: 91 BPM | SYSTOLIC BLOOD PRESSURE: 96 MMHG

## 2019-04-13 VITALS — HEART RATE: 80 BPM | DIASTOLIC BLOOD PRESSURE: 50 MMHG | SYSTOLIC BLOOD PRESSURE: 92 MMHG | RESPIRATION RATE: 18 BRPM

## 2019-04-13 LAB
ADD MAN DIFF?: NO
ADD MAN DIFF?: NO
BASOPHIL #: 0 10^3/UL (ref 0–0.1)
BASOPHIL #: 0 10^3/UL (ref 0–0.1)
BASOPHILS %: 0.1 % (ref 0–2)
BASOPHILS %: 0.2 % (ref 0–2)
EOSINOPHILS #: 0 10^3/UL (ref 0–0.5)
EOSINOPHILS #: 0 10^3/UL (ref 0–0.5)
EOSINOPHILS %: 0.1 % (ref 0–7)
EOSINOPHILS %: 0.3 % (ref 0–7)
HEMATOCRIT: 31.6 % (ref 37–47)
HEMATOCRIT: 32.8 % (ref 37–47)
HEMOGLOBIN: 10.6 G/DL (ref 12–16)
HEMOGLOBIN: 11 G/DL (ref 12–16)
IMMATURE GRANS #M: 0.11 10^3/UL (ref 0–0.03)
IMMATURE GRANS #M: 0.11 10^3/UL (ref 0–0.03)
IMMATURE GRANS % (M): 0.7 % (ref 0–0.43)
IMMATURE GRANS % (M): 0.8 % (ref 0–0.43)
LYMPHOCYTES #: 1.7 10^3/UL (ref 0.8–2.9)
LYMPHOCYTES #: 1.9 10^3/UL (ref 0.8–2.9)
LYMPHOCYTES %: 10.7 % (ref 15–51)
LYMPHOCYTES %: 13.9 % (ref 15–51)
MEAN CORPUSCULAR HEMOGLOBIN: 30.3 PG (ref 29–33)
MEAN CORPUSCULAR HEMOGLOBIN: 30.5 PG (ref 29–33)
MEAN CORPUSCULAR HGB CONC: 33.5 G/DL (ref 32–37)
MEAN CORPUSCULAR HGB CONC: 33.5 G/DL (ref 32–37)
MEAN CORPUSCULAR VOLUME: 90.3 FL (ref 82–101)
MEAN CORPUSCULAR VOLUME: 90.9 FL (ref 82–101)
MEAN PLATELET VOLUME: 9.4 FL (ref 7.4–10.4)
MEAN PLATELET VOLUME: 9.7 FL (ref 7.4–10.4)
MONOCYTE #: 0.8 10^3/UL (ref 0.3–0.9)
MONOCYTE #: 1.4 10^3/UL (ref 0.3–0.9)
MONOCYTES %: 6 % (ref 0–11)
MONOCYTES %: 8.8 % (ref 0–11)
NEUTROPHIL #: 10.6 10^3/UL (ref 1.6–7.5)
NEUTROPHIL #: 12.4 10^3/UL (ref 1.6–7.5)
NEUTROPHILS %: 78.8 % (ref 39–77)
NEUTROPHILS %: 79.6 % (ref 39–77)
NUCLEATED RED BLOOD CELLS #: 0 10^3/UL (ref 0–0)
NUCLEATED RED BLOOD CELLS #: 0 10^3/UL (ref 0–0)
NUCLEATED RED BLOOD CELLS%: 0 /100WBC (ref 0–0)
NUCLEATED RED BLOOD CELLS%: 0 /100WBC (ref 0–0)
PLATELET COUNT: 177 10^3/UL (ref 140–415)
PLATELET COUNT: 188 10^3/UL (ref 140–415)
RED BLOOD COUNT: 3.5 10^6/UL (ref 4.2–5.4)
RED BLOOD COUNT: 3.61 10^6/UL (ref 4.2–5.4)
RED CELL DISTRIBUTION WIDTH: 13.8 % (ref 11.5–14.5)
RED CELL DISTRIBUTION WIDTH: 13.9 % (ref 11.5–14.5)
WHITE BLOOD COUNT: 13.5 10^3/UL (ref 4.8–10.8)
WHITE BLOOD COUNT: 15.6 10^3/UL (ref 4.8–10.8)

## 2019-04-13 RX ADMIN — IBUPROFEN SCH MG: 600 TABLET ORAL at 12:16

## 2019-04-13 RX ADMIN — DOCUSATE SODIUM AND SENNOSIDES SCH TAB: 8.6; 5 TABLET, FILM COATED ORAL at 21:01

## 2019-04-13 RX ADMIN — IBUPROFEN SCH MG: 600 TABLET ORAL at 23:50

## 2019-04-13 RX ADMIN — Medication SCH MLS/HR: at 02:30

## 2019-04-13 RX ADMIN — IBUPROFEN 1 MG: 600 TABLET ORAL at 05:54

## 2019-04-13 RX ADMIN — Medication 1 MLS/HR: at 21:54

## 2019-04-13 RX ADMIN — Medication 1 MLS/HR: at 02:30

## 2019-04-13 RX ADMIN — IBUPROFEN SCH MG: 600 TABLET ORAL at 00:07

## 2019-04-13 RX ADMIN — Medication 1 MLS/HR: at 21:53

## 2019-04-13 RX ADMIN — DOCUSATE SODIUM AND SENNOSIDES 1 TAB: 8.6; 5 TABLET, FILM COATED ORAL at 09:07

## 2019-04-13 RX ADMIN — Medication 1 MLS/HR: at 22:30

## 2019-04-13 RX ADMIN — Medication SCH MLS/HR: at 22:30

## 2019-04-13 RX ADMIN — Medication SCH MLS/HR: at 14:30

## 2019-04-13 RX ADMIN — Medication SCH MLS/HR: at 21:54

## 2019-04-13 RX ADMIN — IBUPROFEN SCH MG: 600 TABLET ORAL at 18:05

## 2019-04-13 RX ADMIN — IBUPROFEN SCH MG: 600 TABLET ORAL at 05:54

## 2019-04-13 RX ADMIN — DOCUSATE SODIUM AND SENNOSIDES SCH TAB: 8.6; 5 TABLET, FILM COATED ORAL at 09:07

## 2019-04-13 RX ADMIN — Medication SCH MLS/HR: at 21:53

## 2019-04-13 RX ADMIN — DOCUSATE SODIUM AND SENNOSIDES 1 TAB: 8.6; 5 TABLET, FILM COATED ORAL at 21:01

## 2019-04-13 RX ADMIN — Medication 1 MLS/HR: at 14:30

## 2019-04-13 RX ADMIN — IBUPROFEN 1 MG: 600 TABLET ORAL at 12:16

## 2019-04-13 RX ADMIN — IBUPROFEN 1 MG: 600 TABLET ORAL at 18:05

## 2019-04-13 RX ADMIN — IBUPROFEN 1 MG: 600 TABLET ORAL at 23:50

## 2019-04-13 RX ADMIN — IBUPROFEN 1 MG: 600 TABLET ORAL at 00:07

## 2019-04-13 RX ADMIN — MEASLES, MUMPS, AND RUBELLA VIRUS VACCINE LIVE 1 ML: 1000; 12500; 1000 INJECTION, POWDER, LYOPHILIZED, FOR SUSPENSION SUBCUTANEOUS at 19:36

## 2019-04-13 NOTE — QN
Documentation


Comment


no b.m


c/o afterpain


Vss  afebrile 


fundus firm 


lochia min


calf  neg for tenderness


A  s/p 


     mod leukocytosis


P repeat CBC  at 1800











SHEKHAR BERRY MD                2019 16:45

## 2019-04-13 NOTE — PAC
Date/Time of Note


Date/Time of Note


DATE: 4/13/19 


TIME: 11:37





Post-Anesthesia Notes


Post-Anesthesia Note


Last documented vital signs





Vital Signs


  Date      Temp  Pulse  Resp  B/P (MAP)   Pulse Ox  O2          O2 Flow    FiO2


Time                                                 Delivery    Rate


   4/13/19  98.0     64    17  89/51 (64)            Room Air


     08:30





Activity:  WNL


Respiratory function:  WNL


Cardiovascular function:  WNL


Mental status:  Baseline


Pain reasonably controlled:  Yes


Hydration appropriate:  Yes


Nausea/Vomiting absent:  Yes











ELLEN RHOADES MD               Apr 13, 2019 11:37

## 2019-04-14 VITALS — HEART RATE: 66 BPM | RESPIRATION RATE: 18 BRPM | SYSTOLIC BLOOD PRESSURE: 111 MMHG | DIASTOLIC BLOOD PRESSURE: 58 MMHG

## 2019-04-14 VITALS — HEART RATE: 80 BPM | DIASTOLIC BLOOD PRESSURE: 61 MMHG | RESPIRATION RATE: 14 BRPM | SYSTOLIC BLOOD PRESSURE: 98 MMHG

## 2019-04-14 RX ADMIN — IBUPROFEN SCH MG: 600 TABLET ORAL at 06:08

## 2019-04-14 RX ADMIN — Medication 1 MLS/HR: at 02:30

## 2019-04-14 RX ADMIN — Medication 1 MLS/HR: at 06:30

## 2019-04-14 RX ADMIN — Medication SCH MLS/HR: at 06:30

## 2019-04-14 RX ADMIN — CLOSTRIDIUM TETANI TOXOID ANTIGEN (FORMALDEHYDE INACTIVATED), CORYNEBACTERIUM DIPHTHERIAE TOXOID ANTIGEN (FORMALDEHYDE INACTIVATED), BORDETELLA PERTUSSIS TOXOID ANTIGEN (GLUTARALDEHYDE INACTIVATED), BORDETELLA PERTUSSIS FILAMENTOUS HEMAGGLUTININ ANTIGEN (FORMALDEHYDE INACTIVATED), BORDETELLA PERTUSSIS PERTACTIN ANTIGEN, AND BORDETELLA PERTUSSIS FIMBRIAE 2/3 ANTIGEN 1 ML: 5; 2; 2.5; 5; 3; 5 INJECTION, SUSPENSION INTRAMUSCULAR at 09:21

## 2019-04-14 RX ADMIN — DOCUSATE SODIUM AND SENNOSIDES 1 TAB: 8.6; 5 TABLET, FILM COATED ORAL at 09:21

## 2019-04-14 RX ADMIN — DOCUSATE SODIUM AND SENNOSIDES SCH TAB: 8.6; 5 TABLET, FILM COATED ORAL at 09:21

## 2019-04-14 RX ADMIN — IBUPROFEN 1 MG: 600 TABLET ORAL at 06:08

## 2019-04-14 RX ADMIN — IBUPROFEN 1 MG: 600 TABLET ORAL at 12:20

## 2019-04-14 RX ADMIN — Medication SCH MLS/HR: at 02:30

## 2019-04-14 RX ADMIN — IBUPROFEN SCH MG: 600 TABLET ORAL at 12:20

## 2019-04-14 NOTE — QN
Documentation


Comment


PPD# 2 is stable afebrile tolerates diet No VB +BM +voids


Vs stable


gen NAD


Abd soft NT ND


Genitalia No blood at perineum


--->Discharge home











BERTRAND DAO M.D.            Apr 14, 2019 15:18

## 2019-04-14 NOTE — DS
Date/Time of Note


Date/Time of Note


DATE: 4/14/19 


TIME: 15:19





Discharge Summary


Admission/Discharge Info


Admit Date/Time


Apr 11, 2019 at 16:19


Discharge Date/Time


4/14/2019


Discharge Diagnosis


postpartum


Patient Condition:  Good


Hospital Course


uneventful


Home Meds


Reported Medications


Ferrous Sulfate (Iron) 134 Mg Tablet, 134 MG PO, TAB


   3/31/19


PNV115-Iron Fumarate-FA-DSS (Prenatal 19) 1 Each Tablet, 1 TAB PO DAILY, TAB


   3/25/19


Primary Care Provider


[facility]


Pending Labs





Laboratory Tests


         Test
                                            4/13/19
18:03


         White Blood Count
                     13.5 10^3/ul
(4.8-10.8)


         Red Blood Count
                      3.50 10^6/ul
(4.20-5.40)


         Hemoglobin
                              10.6 g/dl
(12.0-16.0)


         Hematocrit
                                 31.6 %
(37.0-47.0)


         Mean Corpuscular Volume
                  90.3 fl
(82.0-101.0)


         Mean Corpuscular Hemoglobin
               30.3 pg
(29.0-33.0)


         Mean Corpuscular Hemoglobin
Concent      33.5 g/dl
(32.0-37.0)


         Red Cell Distribution Width
                13.9 %
(11.5-14.5)


         Platelet Count
                          188 10^3/UL
(140-415)


         Mean Platelet Volume
                        9.4 fl
(7.4-10.4)


         Immature Granulocytes %
                 0.800 %
(0.001-0.429)


         Neutrophils %
                              78.8 %
(39.0-77.0)


         Lymphocytes %
                              13.9 %
(15.0-51.0)


         Monocytes %
                                  6.0 %
(0.0-11.0)


         Eosinophils %
                                 0.3 %
(0.0-7.0)


         Basophils %
                                   0.2 %
(0.0-2.0)


         Nucleated Red Blood Cells %
             0.0 /100WBC
(0.0-0.0)


         Immature Granulocytes #
             0.110 10^3/ul
(0.0-0.031)


         Neutrophils #
                          10.6 10^3/ul
(1.6-7.5)


         Lymphocytes #
                           1.9 10^3/ul
(0.8-2.9)


         Monocytes #
                             0.8 10^3/ul
(0.3-0.9)


         Eosinophils #
                           0.0 10^3/ul
(0.0-0.5)


         Basophils #
                             0.0 10^3/ul
(0.0-0.1)


         Nucleated Red Blood Cells #
             0.0 10^3/ul
(0.0-0.0)














BERTRAND DAO M.D.            Apr 14, 2019 15:19

## 2019-04-15 NOTE — DELSUM
===================================

Delivery Summary A-C

===================================

Datetime Report Generated by CPN: 04/15/2019 16:06

   

   

===================================

DELIVERY PERSONNEL

===================================

   

Circulator:  Sebunnya, Phoebe

   

===================================

MATERNAL INFORMATION

===================================

   

Delivery Anesthesia:  Local; Epidural

Medications in Delivery:  LR 500ML PITOCIN 30 UNITS

Delivery QBL (ml):  200

Placenta Cultured:  No

Maternal Complications:  Prolong Labor >20Hrs; None

   

===================================

LABOR SUMMARY

===================================

   

EDC:  2019 00:00

No. Babies in Womb:  1

 Attempted:  No

Labor Anesthesia:  Epidural

   

===================================

LABOR INFORMATION

===================================

   

Reason for Induction:  Not Applicable

Onset of Labor:  2019 15:00

Complete Dilatation:  2019 13:58

Oxytocin:  Augmentation

Group B Beta Strep:  Negative

Antibiotics # of Doses:  1

Antibiotics Time of Last Dose:  2019 10:50

Steroids Given:  None

Reason Steroids Not Administered:  Not Applicable

   

===================================

MEMBRANES

===================================

   

Membranes Rupture Method:  Spontaneous

Rupture of Membranes:  2019 15:00

Length of Rupture (hr):  23.42

Amniotic Fluid Color:  Clear

Amniotic Fluid Amount:  Moderate

Amniotic Fluid Odor:  Normal

   

===================================

STAGES OF LABOR

===================================

   

Stage 1 hr:  22

Stage 1 min:  58

Stage 2 hr:  0

Stage 2 min:  27

Stage 3 hr:  0

Stage 3 min:  1

Total Time in Labor hr:  23

Total Time in Labor min:  26

   

===================================

VAGINAL DELIVERY

===================================

   

Episiotomy:  Median

Laceration Extension:  N/A

Laceration Type:  None

Laceration Repair:  Not Applicable

Initial Vag Sponge Count:  10

Final Vag Sponge Count:  10

Initial Vag Sharps Count:  2

Final Vag Sharps Count:  2

Sharps Count Correct:  Yes

   

===================================

BABY A INFORMATION

===================================

   

Infant Delivery Date/Time:  2019 14:25

Method of Delivery:  Vaginal

Born in Route :  No

:  N/A

Forceps:  N/A

Vacuum Extraction:  N/A

Shoulder Dystocia :  N/A

   

===================================

SHOULDER DYSTOCIA BABY A

===================================

   

Infant Delivery Date/Time:  2019 14:25

   

===================================

PRESENTATION/POSITION BABY A

===================================

   

Presentation:  Cephalic

Cephalic Presentation:  Vertex

Vertex Position:  Left Occipital Anterior

Breech Presentation:  N/A

   

===================================

PLACENTA INFORMATION BABY A

===================================

   

Placenta Delivery Time :  2019 14:26

Placenta Method of Delivery:  Expressed

Placenta Status:  Delivered

   

===================================

APGAR SCORES BABY A

===================================

   

Heart Rate 1 min:  >100 bpm

Resp Effort 1 min:  Slow, Irregular

Reflex Irritability 1 min:  Cough/Sneeze/Pulls Away

Muscle Tone 1 min:  Active Motion

Color 1 min:  Body Pink, Extremit Blue

APGAR SCORE 1 MIN:  8

Heart Rate 5 min:  >100 bpm

Resp Effort 5 min:  Good Cry

Reflex Irritability 5 min:  Cough/Sneeze/Pulls Away

Muscle Tone 5 min:  Active Motion

Color 5 min:  Body Pink, Extremit Blue

APGAR SCORE 5 MIN:  9

   

===================================

INFANT INFORMATION BABY A

===================================

   

Gestational Age at Delivery:  38.5

Gestational Status:  Early Term- 37- 38.6 Weeks

Infant Outcome :  Liveborn

Infant Condition :  Stable

Infant Sex:  Female

   

===================================

IDENTIFICATION/MEDS BABY A

===================================

   

ID Band Number:  [id number]

ID Band Location:  Right Leg; Left Arm

Sensor Applied:  Yes

Sensor Number:  [id number]

Sensor Location :  Cord Clamp

Vitamin K Given :  Not Given

Erythromycin Given:  Not Given

   

===================================

WEIGHT/LENGTH BABY A

===================================

   

Infant Birthweight (gm):  3640

Infant Weight (lb):  8

Infant Weight (oz):  0

Infant Length (in):  20.00

Infant Length (cm):  50.80

   

===================================

CORD INFORMATION BABY A

===================================

   

No. Cord Vessels:  3

Nuchal Cord :  N/A

Nuchal Cord- Other:  0

True Knot:  0

Cord Blood Taken:  Yes

Banking/Donate Info:  NONE

Infant Suction:  Mouth; Nose

   

===================================

ASSESSMENT BABY A

===================================

   

Infant Complications:  Multiple Variable Decels